# Patient Record
Sex: FEMALE | Race: WHITE | NOT HISPANIC OR LATINO | URBAN - METROPOLITAN AREA
[De-identification: names, ages, dates, MRNs, and addresses within clinical notes are randomized per-mention and may not be internally consistent; named-entity substitution may affect disease eponyms.]

---

## 2020-08-07 ENCOUNTER — OFFICE VISIT (OUTPATIENT)
Dept: OBSTETRICS AND GYNECOLOGY | Facility: CLINIC | Age: 35
End: 2020-08-07

## 2020-08-07 VITALS
BODY MASS INDEX: 39.51 KG/M2 | WEIGHT: 223 LBS | SYSTOLIC BLOOD PRESSURE: 116 MMHG | HEIGHT: 63 IN | DIASTOLIC BLOOD PRESSURE: 78 MMHG

## 2020-08-07 DIAGNOSIS — Z01.419 PAP SMEAR, LOW-RISK: ICD-10-CM

## 2020-08-07 DIAGNOSIS — Z13.9 SCREENING FOR UNSPECIFIED CONDITION: ICD-10-CM

## 2020-08-07 DIAGNOSIS — Z11.51 SCREENING FOR HPV (HUMAN PAPILLOMAVIRUS): ICD-10-CM

## 2020-08-07 DIAGNOSIS — Z30.09 ENCOUNTER FOR COUNSELING REGARDING CONTRACEPTION: ICD-10-CM

## 2020-08-07 DIAGNOSIS — Z01.419 WELL WOMAN EXAM: Primary | ICD-10-CM

## 2020-08-07 DIAGNOSIS — N92.6 IRREGULAR MENSES: ICD-10-CM

## 2020-08-07 DIAGNOSIS — R63.8 INCREASED BMI (BODY MASS INDEX): ICD-10-CM

## 2020-08-07 LAB

## 2020-08-07 PROCEDURE — 81025 URINE PREGNANCY TEST: CPT | Performed by: NURSE PRACTITIONER

## 2020-08-07 PROCEDURE — 99212 OFFICE O/P EST SF 10 MIN: CPT | Performed by: NURSE PRACTITIONER

## 2020-08-07 PROCEDURE — 99395 PREV VISIT EST AGE 18-39: CPT | Performed by: NURSE PRACTITIONER

## 2020-08-07 PROCEDURE — 81002 URINALYSIS NONAUTO W/O SCOPE: CPT | Performed by: NURSE PRACTITIONER

## 2020-08-07 RX ORDER — FLUTICASONE PROPIONATE 50 MCG
SPRAY, SUSPENSION (ML) NASAL
COMMUNITY
End: 2022-04-05 | Stop reason: SDUPTHER

## 2020-08-07 RX ORDER — CITALOPRAM 40 MG/1
40 TABLET ORAL DAILY
COMMUNITY
Start: 2015-04-06 | End: 2021-06-17

## 2020-08-11 PROBLEM — R87.610 ASCUS WITH POSITIVE HIGH RISK HPV CERVICAL: Status: ACTIVE | Noted: 2020-08-11

## 2020-08-11 PROBLEM — R87.810 ASCUS WITH POSITIVE HIGH RISK HPV CERVICAL: Status: ACTIVE | Noted: 2020-08-11

## 2020-08-11 LAB
CYTOLOGIST CVX/VAG CYTO: ABNORMAL
CYTOLOGY CVX/VAG DOC CYTO: ABNORMAL
CYTOLOGY CVX/VAG DOC THIN PREP: ABNORMAL
DX ICD CODE: ABNORMAL
DX ICD CODE: ABNORMAL
HIV 1 & 2 AB SER-IMP: ABNORMAL
HPV I/H RISK 1 DNA CVX QL PROBE+SIG AMP: POSITIVE
OTHER STN SPEC: ABNORMAL
PATHOLOGIST CVX/VAG CYTO: ABNORMAL
RECOM F/U CVX/VAG CYTO: ABNORMAL
STAT OF ADQ CVX/VAG CYTO-IMP: ABNORMAL

## 2020-10-15 ENCOUNTER — OFFICE VISIT (OUTPATIENT)
Dept: OBSTETRICS AND GYNECOLOGY | Facility: CLINIC | Age: 35
End: 2020-10-15

## 2020-10-15 VITALS
SYSTOLIC BLOOD PRESSURE: 122 MMHG | WEIGHT: 221.2 LBS | DIASTOLIC BLOOD PRESSURE: 88 MMHG | BODY MASS INDEX: 39.19 KG/M2 | HEIGHT: 63 IN

## 2020-10-15 DIAGNOSIS — Z13.9 SCREENING FOR UNSPECIFIED CONDITION: ICD-10-CM

## 2020-10-15 DIAGNOSIS — R87.610 ASCUS WITH POSITIVE HIGH RISK HPV CERVICAL: Primary | ICD-10-CM

## 2020-10-15 DIAGNOSIS — Z13.9 SCREENING FOR CONDITION: ICD-10-CM

## 2020-10-15 DIAGNOSIS — R87.810 ASCUS WITH POSITIVE HIGH RISK HPV CERVICAL: Primary | ICD-10-CM

## 2020-10-15 LAB
B-HCG UR QL: NEGATIVE
BILIRUB BLD-MCNC: NEGATIVE MG/DL
CLARITY, POC: CLEAR
COLOR UR: YELLOW
GLUCOSE UR STRIP-MCNC: NEGATIVE MG/DL
INTERNAL NEGATIVE CONTROL: NEGATIVE
INTERNAL POSITIVE CONTROL: POSITIVE
KETONES UR QL: NEGATIVE
LEUKOCYTE EST, POC: NEGATIVE
Lab: 55
NITRITE UR-MCNC: NEGATIVE MG/ML
PH UR: 5 [PH] (ref 5–8)
PROT UR STRIP-MCNC: NEGATIVE MG/DL
RBC # UR STRIP: NEGATIVE /UL
SP GR UR: 1 (ref 1–1.03)
UROBILINOGEN UR QL: NORMAL

## 2020-10-15 PROCEDURE — 57456 ENDOCERV CURETTAGE W/SCOPE: CPT | Performed by: OBSTETRICS & GYNECOLOGY

## 2020-10-15 PROCEDURE — 81025 URINE PREGNANCY TEST: CPT | Performed by: OBSTETRICS & GYNECOLOGY

## 2020-10-15 PROCEDURE — 81002 URINALYSIS NONAUTO W/O SCOPE: CPT | Performed by: OBSTETRICS & GYNECOLOGY

## 2020-10-19 LAB
DX ICD CODE: NORMAL
PATH REPORT.FINAL DX SPEC: NORMAL
PATH REPORT.GROSS SPEC: NORMAL
PATH REPORT.SITE OF ORIGIN SPEC: NORMAL
PATHOLOGIST NAME: NORMAL
PAYMENT PROCEDURE: NORMAL

## 2021-05-11 ENCOUNTER — OFFICE VISIT (OUTPATIENT)
Dept: OBSTETRICS AND GYNECOLOGY | Facility: CLINIC | Age: 36
End: 2021-05-11

## 2021-05-11 VITALS
HEIGHT: 63 IN | DIASTOLIC BLOOD PRESSURE: 82 MMHG | BODY MASS INDEX: 39.23 KG/M2 | SYSTOLIC BLOOD PRESSURE: 112 MMHG | WEIGHT: 221.4 LBS

## 2021-05-11 DIAGNOSIS — Z11.51 SPECIAL SCREENING EXAMINATION FOR HUMAN PAPILLOMAVIRUS (HPV): ICD-10-CM

## 2021-05-11 DIAGNOSIS — Z01.419 PAP SMEAR, LOW-RISK: ICD-10-CM

## 2021-05-11 DIAGNOSIS — N87.9 CERVICAL DYSPLASIA: Primary | ICD-10-CM

## 2021-05-11 DIAGNOSIS — Z13.9 SCREENING FOR CONDITION: ICD-10-CM

## 2021-05-11 LAB
B-HCG UR QL: NEGATIVE
BILIRUB BLD-MCNC: NEGATIVE MG/DL
CLARITY, POC: CLEAR
COLOR UR: YELLOW
GLUCOSE UR STRIP-MCNC: NEGATIVE MG/DL
INTERNAL NEGATIVE CONTROL: NEGATIVE
INTERNAL POSITIVE CONTROL: POSITIVE
KETONES UR QL: NEGATIVE
LEUKOCYTE EST, POC: NEGATIVE
Lab: NORMAL
NITRITE UR-MCNC: NEGATIVE MG/ML
PH UR: 5 [PH] (ref 5–8)
PROT UR STRIP-MCNC: NEGATIVE MG/DL
RBC # UR STRIP: NEGATIVE /UL
SP GR UR: 1.02 (ref 1–1.03)
UROBILINOGEN UR QL: NORMAL

## 2021-05-11 PROCEDURE — 57454 BX/CURETT OF CERVIX W/SCOPE: CPT | Performed by: OBSTETRICS & GYNECOLOGY

## 2021-05-11 PROCEDURE — 81002 URINALYSIS NONAUTO W/O SCOPE: CPT | Performed by: OBSTETRICS & GYNECOLOGY

## 2021-05-11 PROCEDURE — 81025 URINE PREGNANCY TEST: CPT | Performed by: OBSTETRICS & GYNECOLOGY

## 2021-05-11 PROCEDURE — 99213 OFFICE O/P EST LOW 20 MIN: CPT | Performed by: OBSTETRICS & GYNECOLOGY

## 2021-05-11 NOTE — PROGRESS NOTES
Colposcopy Procedure Note    Chief Complaint: cervical dysplasia    Colposcopy    Date/Time: 5/11/2021 1:02 PM  Performed by: Gail Duenas DO  Authorized by: Gail Duenas DO   Preparation: Patient was prepped and draped in the usual sterile fashion.  Local anesthesia used: no    Anesthesia:  Local anesthesia used: no    Sedation:  Patient sedated: no    Patient tolerance: patient tolerated the procedure well with no immediate complications          Indications: Most recent Pap smear showed: ASCUS with POSITIVE high risk HPV.  Prior cervical/vaginal disease: CIERRA 1.  Prior cervical treatment: no treatment.    Procedure Details   The risks and benefits of the procedure and Verbal informed consent obtained.    Speculum placed in vagina and excellent visualization of cervix achieved, cervix swabbed x 3 with acetic acid solution and Lugol's solution     Findings:  Cervix: visible lesion(s) at 7 o'clock; endocervical curettage performed, cervical biopsies taken at 7 o'clock, specimen labelled and sent to pathology and hemostasis achieved with Monsel's solution.  Vaginal inspection: vaginal colposcopy not performed.  Vulvar colposcopy: vulvar colposcopy not performed.    Specimens: cervical biopsy and ECC    Complications: none.    Plan:  Specimens labelled and sent to Pathology.  Will base further treatment on Pathology findings.  Treatment options discussed with patient.    Gail Duenas DO   5/11/2021  13:00 EDT

## 2021-05-13 LAB
CYTOLOGIST CVX/VAG CYTO: NORMAL
CYTOLOGY CVX/VAG DOC CYTO: NORMAL
CYTOLOGY CVX/VAG DOC THIN PREP: NORMAL
DX ICD CODE: NORMAL
DX ICD CODE: NORMAL
HIV 1 & 2 AB SER-IMP: NORMAL
HPV I/H RISK 4 DNA CVX QL PROBE+SIG AMP: NEGATIVE
Lab: NORMAL
OTHER STN SPEC: NORMAL
PATH REPORT.FINAL DX SPEC: NORMAL
PATH REPORT.GROSS SPEC: NORMAL
PATH REPORT.RELEVANT HX SPEC: NORMAL
PATH REPORT.SITE OF ORIGIN SPEC: NORMAL
PATHOLOGIST NAME: NORMAL
PAYMENT PROCEDURE: NORMAL
RECOM F/U CVX/VAG CYTO: NORMAL
STAT OF ADQ CVX/VAG CYTO-IMP: NORMAL

## 2021-06-30 ENCOUNTER — TELEPHONE (OUTPATIENT)
Dept: OBSTETRICS AND GYNECOLOGY | Facility: CLINIC | Age: 36
End: 2021-06-30

## 2021-06-30 NOTE — TELEPHONE ENCOUNTER
Her symptoms have nothing to do with the colposcopy done 6 weeks ago. It sounds to me like she is having her cycle. She should take Ibuprofen 800mg every 8 hrs

## 2021-11-12 ENCOUNTER — LAB REQUISITION (OUTPATIENT)
Dept: LAB | Facility: HOSPITAL | Age: 36
End: 2021-11-12

## 2021-11-12 DIAGNOSIS — Z00.00 ENCOUNTER FOR GENERAL ADULT MEDICAL EXAMINATION WITHOUT ABNORMAL FINDINGS: ICD-10-CM

## 2021-11-12 PROCEDURE — U0004 COV-19 TEST NON-CDC HGH THRU: HCPCS | Performed by: PLASTIC SURGERY

## 2021-11-13 LAB — SARS-COV-2 ORF1AB RESP QL NAA+PROBE: NOT DETECTED

## 2021-12-10 ENCOUNTER — OFFICE VISIT (OUTPATIENT)
Dept: OBSTETRICS AND GYNECOLOGY | Facility: CLINIC | Age: 36
End: 2021-12-10

## 2021-12-10 VITALS
SYSTOLIC BLOOD PRESSURE: 114 MMHG | HEIGHT: 64 IN | DIASTOLIC BLOOD PRESSURE: 88 MMHG | BODY MASS INDEX: 37.49 KG/M2 | WEIGHT: 219.6 LBS

## 2021-12-10 DIAGNOSIS — Z11.51 ENCOUNTER FOR SCREENING FOR HUMAN PAPILLOMAVIRUS (HPV): ICD-10-CM

## 2021-12-10 DIAGNOSIS — Z01.419 PAP SMEAR, LOW-RISK: Primary | ICD-10-CM

## 2021-12-10 DIAGNOSIS — N94.3 PMS (PREMENSTRUAL SYNDROME): ICD-10-CM

## 2021-12-10 LAB
B-HCG UR QL: NEGATIVE
BILIRUB BLD-MCNC: NEGATIVE MG/DL
CLARITY, POC: CLEAR
COLOR UR: YELLOW
EXPIRATION DATE: NORMAL
GLUCOSE UR STRIP-MCNC: NEGATIVE MG/DL
INTERNAL NEGATIVE CONTROL: NEGATIVE
INTERNAL POSITIVE CONTROL: POSITIVE
KETONES UR QL: NEGATIVE
LEUKOCYTE EST, POC: NEGATIVE
Lab: 55
NITRITE UR-MCNC: NEGATIVE MG/ML
PH UR: 5 [PH] (ref 5–8)
PROT UR STRIP-MCNC: NEGATIVE MG/DL
RBC # UR STRIP: NEGATIVE /UL
SP GR UR: 1 (ref 1–1.03)
UROBILINOGEN UR QL: NORMAL

## 2021-12-10 PROCEDURE — 99213 OFFICE O/P EST LOW 20 MIN: CPT | Performed by: NURSE PRACTITIONER

## 2021-12-10 PROCEDURE — 81002 URINALYSIS NONAUTO W/O SCOPE: CPT | Performed by: NURSE PRACTITIONER

## 2021-12-10 PROCEDURE — 81025 URINE PREGNANCY TEST: CPT | Performed by: NURSE PRACTITIONER

## 2021-12-10 RX ORDER — SERTRALINE HYDROCHLORIDE 100 MG/1
100 TABLET, FILM COATED ORAL DAILY
COMMUNITY
Start: 2021-11-24 | End: 2022-11-24

## 2021-12-10 RX ORDER — ONDANSETRON 4 MG/1
TABLET, FILM COATED ORAL
COMMUNITY
Start: 2021-11-17 | End: 2022-04-05 | Stop reason: SDUPTHER

## 2021-12-10 NOTE — PROGRESS NOTES
"Subjective     Chief Complaint   Patient presents with   • Follow-up     PMS        Delmy Cruz is a 36 y.o.  whose LMP is Patient's last menstrual period was 11/15/2021.. She presents today with c/o PMS. She has normal periods in timing. She is not on contraception. She reports approx 2 weeks before her cycle she has intense cramping. She reports her bleeding last approx 7 days. Reports the first 2-3 days she uses a tampon and a pad. She does this as precautionary. She does not report blood clots.     She has been on birthcontrol in the past.     She also has a history of abnormal pap smears. She was last seen in  and had a normal pap and normal ECC. Per Dr. Duenas, a repeat pap in 6 months was recommended.  Her pap and colpo history are as follows:  NIL/HPV+,  ASCUS/HPV+, 10/20 ECC CIERRA 1,  NIL/HPV neg and ECC normal.     HPI    HPI    The following portions of the patient's history were reviewed and updated as appropriate:vital signs, allergies, current medications, past medical history, past social history, past surgical history and problem list      Review of Systems     Review of Systems   Constitutional: Negative.    Gastrointestinal: Negative.    Genitourinary: Positive for frequency and menstrual problem (painful periods ). Negative for vaginal discharge.   Musculoskeletal: Negative.    Skin: Negative.    Psychiatric/Behavioral: Negative.        Objective      /88   Ht 162.6 cm (64\")   Wt 99.6 kg (219 lb 9.6 oz)   LMP 11/15/2021   Breastfeeding No   BMI 37.69 kg/m²     Physical Exam    Physical Exam  Vitals reviewed.   Constitutional:       Appearance: She is well-developed.   Pulmonary:      Effort: Pulmonary effort is normal.   Genitourinary:     Exam position: Supine.      Labia:         Right: No rash, tenderness, lesion or injury.         Left: No rash, tenderness, lesion or injury.       Vagina: No signs of injury and foreign body. No vaginal discharge, erythema, " tenderness or bleeding.      Cervix: No cervical motion tenderness, discharge or friability.   Musculoskeletal:         General: No swelling. Normal range of motion.   Skin:     General: Skin is warm and dry.   Neurological:      General: No focal deficit present.      Mental Status: She is alert and oriented to person, place, and time.   Psychiatric:         Mood and Affect: Mood normal.         Behavior: Behavior normal.         Lab Review   Labs: Urine pregnancy test, Urinalysis - with micro     Imaging   No data reviewed    Assessment  Diagnoses and all orders for this visit:    1. PMS (premenstrual syndrome) (Primary)  -     POC Urinalysis Dipstick  -     POC Pregnancy, Urine        Additional Assessment:   1. Abnormal pap  2. Dysmenorrhea  3. Frequent urination     Plan     1. Abnormal pap- Check repeat pap today.   2. Dysmenorrhea- Check TVUS. Disc possible use of contraception to help control cycles. Disc plan of care TBD on US findings.   3. Frequent urination- Check urine cx.      RTO BARRY Cobb, ABDIRAHMAN  12/10/2021

## 2021-12-15 LAB
CYTOLOGIST CVX/VAG CYTO: ABNORMAL
CYTOLOGY CVX/VAG DOC CYTO: ABNORMAL
CYTOLOGY CVX/VAG DOC THIN PREP: ABNORMAL
DX ICD CODE: ABNORMAL
DX ICD CODE: ABNORMAL
HIV 1 & 2 AB SER-IMP: ABNORMAL
HPV I/H RISK 4 DNA CVX QL PROBE+SIG AMP: POSITIVE
OTHER STN SPEC: ABNORMAL
PATHOLOGIST CVX/VAG CYTO: ABNORMAL
RECOM F/U CVX/VAG CYTO: ABNORMAL
STAT OF ADQ CVX/VAG CYTO-IMP: ABNORMAL

## 2022-03-07 ENCOUNTER — TELEPHONE (OUTPATIENT)
Dept: OBSTETRICS AND GYNECOLOGY | Facility: CLINIC | Age: 37
End: 2022-03-07

## 2022-03-07 NOTE — TELEPHONE ENCOUNTER
Pt called stating she needed a Colpo and an US. I looked into her chart and I seen you stated she needs them, but can you clarify what the US is for so we can put her on the schedule? [id number]

## 2022-04-05 ENCOUNTER — OFFICE VISIT (OUTPATIENT)
Dept: OBSTETRICS AND GYNECOLOGY | Facility: CLINIC | Age: 37
End: 2022-04-05

## 2022-04-05 VITALS
BODY MASS INDEX: 37.39 KG/M2 | SYSTOLIC BLOOD PRESSURE: 112 MMHG | HEIGHT: 64 IN | WEIGHT: 219 LBS | DIASTOLIC BLOOD PRESSURE: 82 MMHG

## 2022-04-05 DIAGNOSIS — Z13.89 SCREENING FOR GENITOURINARY CONDITION: ICD-10-CM

## 2022-04-05 DIAGNOSIS — N94.3 PMS (PREMENSTRUAL SYNDROME): ICD-10-CM

## 2022-04-05 DIAGNOSIS — N87.9 CERVICAL DYSPLASIA: Primary | ICD-10-CM

## 2022-04-05 PROBLEM — Z30.09 ENCOUNTER FOR COUNSELING REGARDING CONTRACEPTION: Status: RESOLVED | Noted: 2020-08-07 | Resolved: 2022-04-05

## 2022-04-05 PROBLEM — Z01.419 WELL WOMAN EXAM: Status: RESOLVED | Noted: 2020-08-07 | Resolved: 2022-04-05

## 2022-04-05 LAB
B-HCG UR QL: NEGATIVE
EXPIRATION DATE: NORMAL
INTERNAL NEGATIVE CONTROL: NEGATIVE
INTERNAL POSITIVE CONTROL: POSITIVE
Lab: NORMAL

## 2022-04-05 PROCEDURE — 81025 URINE PREGNANCY TEST: CPT | Performed by: OBSTETRICS & GYNECOLOGY

## 2022-04-05 PROCEDURE — 57456 ENDOCERV CURETTAGE W/SCOPE: CPT | Performed by: OBSTETRICS & GYNECOLOGY

## 2022-04-05 PROCEDURE — 99213 OFFICE O/P EST LOW 20 MIN: CPT | Performed by: OBSTETRICS & GYNECOLOGY

## 2022-04-05 RX ORDER — BENZONATATE 200 MG/1
200 CAPSULE ORAL 3 TIMES DAILY
COMMUNITY
Start: 2022-03-30

## 2022-04-05 RX ORDER — FLUTICASONE PROPIONATE 50 MCG
2 SPRAY, SUSPENSION (ML) NASAL DAILY
COMMUNITY
Start: 2021-10-15 | End: 2022-10-15

## 2022-04-05 RX ORDER — METHYLPREDNISOLONE 4 MG/1
TABLET ORAL
COMMUNITY
Start: 2022-04-01

## 2022-04-05 RX ORDER — AMOXICILLIN AND CLAVULANATE POTASSIUM 875; 125 MG/1; MG/1
1 TABLET, FILM COATED ORAL 2 TIMES DAILY
COMMUNITY
Start: 2022-04-01 | End: 2022-04-08

## 2022-04-05 RX ORDER — GUAIFENESIN 600 MG/1
600-1200 TABLET, EXTENDED RELEASE ORAL
COMMUNITY
Start: 2022-04-01 | End: 2022-04-08

## 2022-04-05 RX ORDER — ALBUTEROL SULFATE 90 UG/1
AEROSOL, METERED RESPIRATORY (INHALATION)
COMMUNITY
Start: 2022-04-01

## 2022-04-05 NOTE — PROGRESS NOTES
Colposcopy Procedure Note    Chief Complaint: LGSIL    Colposcopy    Date/Time: 4/5/2022 11:08 AM  Performed by: Gail Duenas DO  Authorized by: Gail Duenas DO   Preparation: Patient was prepped and draped in the usual sterile fashion.  Local anesthesia used: no    Anesthesia:  Local anesthesia used: no    Sedation:  Patient sedated: no    Patient tolerance: patient tolerated the procedure well with no immediate complications          Indications: Most recent Pap smear showed: low-grade squamous intraepithelial neoplasia (LGSIL - encompassing HPV,mild dysplasia,CIERRA I).  Prior cervical/vaginal disease: CIERRA 1.  Prior cervical treatment: no treatment.    Procedure Details   The risks and benefits of the procedure and Verbal informed consent obtained.    Speculum placed in vagina and excellent visualization of cervix achieved, cervix swabbed x 3 with acetic acid solution and Lugol's solution     Findings:  Cervix: no visible lesions; endocervical curettage performed, specimen labelled and sent to pathology and hemostasis achieved with Monsel's solution.  Vaginal inspection: vaginal colposcopy not performed.  Vulvar colposcopy: vulvar colposcopy not performed.    Specimens: ECC    Complications: none.    Plan:  Specimens labelled and sent to Pathology.  Will base further treatment on Pathology findings.  Treatment options discussed with patient.    Gail Duenas DO   4/5/2022  10:56 EDT

## 2022-04-05 NOTE — PROGRESS NOTES
"PROBLEM VISIT    Chief Complaint: PMS      Delmy Cruz is a 36 y.o. patient who presents for follow up of cervical dysplasia.  Patient presents for colposcopy.  Patient has had abnormal Pap smears since 2020.  Her last Pap smear was in 12/2021 and revealed LGSIL.  Previous pap smear 5/2021 and was normal.  She had a Pap smear in 8/2020 revealing ASCUS with a colposcopy revealing CIERRA-1.  She is also reporting that she would like to talk about her PMS.  She reports that her cycles are lasting 7 days.  She reports that she has cramping approximately 2 weeks before her cycle and PMS symptoms.  She is interested in starting medication.  She is already on Zoloft 100 mg p.o. twice daily and reports decreased libido on it. TVUS was performed today.    Chief Complaint   Patient presents with   • Colposcopy     LGSIL +HPV             The following portions of the patient's history were reviewed and updated as appropriate: allergies, current medications and problem list.    Review of Systems   Constitutional: Negative for appetite change, chills, fatigue, fever and unexpected weight change.   Gastrointestinal: Negative for abdominal distention, abdominal pain, anal bleeding, blood in stool, constipation, diarrhea, nausea and vomiting.   Genitourinary: Positive for menstrual problem. Negative for dyspareunia, dysuria, pelvic pain, vaginal bleeding, vaginal discharge and vaginal pain.       /82   Ht 162.6 cm (64\")   Wt 99.3 kg (219 lb)   LMP 03/27/2022   BMI 37.59 kg/m²     Physical Exam  Vitals reviewed.   Constitutional:       General: She is not in acute distress.     Appearance: Normal appearance. She is well-developed. She is not ill-appearing, toxic-appearing or diaphoretic.   Genitourinary:     General: Normal vulva.      Labia:         Right: No rash, tenderness, lesion or injury.         Left: No rash, tenderness, lesion or injury.       Vagina: No signs of injury and foreign body. No vaginal discharge, " erythema, tenderness or bleeding.      Cervix: No cervical motion tenderness, discharge or friability.   Skin:     General: Skin is warm.      Coloration: Skin is not pale.      Findings: No erythema or rash.   Neurological:      General: No focal deficit present.      Mental Status: She is alert and oriented to person, place, and time. Mental status is at baseline.      Cranial Nerves: No cranial nerve deficit.      Motor: No weakness.      Coordination: Coordination normal.      Gait: Gait normal.   Psychiatric:         Mood and Affect: Mood normal.         Behavior: Behavior normal.         Thought Content: Thought content normal.         Judgment: Judgment normal.           Assessment/Plan   Diagnoses and all orders for this visit:    1. Cervical dysplasia (Primary)  -     Reference Histopathology  -     Colposcopy    2. Screening for genitourinary condition  -     POC Pregnancy, Urine    3. PMS (premenstrual syndrome)    37yo with cervical dysplasia and PMS/cramping    1) cervical dysplasia: Last Pap smear reveals LGSIL.  Patient has been having recurrent Pap smears since 8/2020.  ECC performed without difficulty.  See procedure note.    2) PMS/cramping: TVUS reveals: AV uterus with EM lining 8mm. Normal ovaries. No free fluid. Patient is already on Zoloft 100 mg p.o. daily.  Discussed starting an OCP to help with PMS symptoms.  Patient agrees. Rx Aviane.     3) contraception: Not currently sexually active.               No follow-ups on file.      Gail Duenas DO    4/5/2022  11:09 EDT

## 2022-04-07 LAB
DX ICD CODE: NORMAL
DX ICD CODE: NORMAL
PATH REPORT.FINAL DX SPEC: NORMAL
PATH REPORT.GROSS SPEC: NORMAL
PATH REPORT.SITE OF ORIGIN SPEC: NORMAL
PATHOLOGIST NAME: NORMAL
PAYMENT PROCEDURE: NORMAL

## 2022-05-23 RX ORDER — LEVONORGESTREL AND ETHINYL ESTRADIOL 0.1-0.02MG
1 KIT ORAL DAILY
Qty: 84 TABLET | Refills: 3 | Status: SHIPPED | OUTPATIENT
Start: 2022-05-23

## 2023-07-27 ENCOUNTER — OFFICE VISIT (OUTPATIENT)
Dept: OBSTETRICS AND GYNECOLOGY | Facility: CLINIC | Age: 38
End: 2023-07-27
Payer: COMMERCIAL

## 2023-07-27 VITALS
HEIGHT: 65 IN | WEIGHT: 228.6 LBS | DIASTOLIC BLOOD PRESSURE: 84 MMHG | BODY MASS INDEX: 38.09 KG/M2 | SYSTOLIC BLOOD PRESSURE: 126 MMHG

## 2023-07-27 DIAGNOSIS — Z01.419 PAP SMEAR, LOW-RISK: ICD-10-CM

## 2023-07-27 DIAGNOSIS — Z01.419 ROUTINE GYNECOLOGICAL EXAMINATION: Primary | ICD-10-CM

## 2023-07-27 DIAGNOSIS — N32.81 OVERACTIVE BLADDER: ICD-10-CM

## 2023-07-27 DIAGNOSIS — Z11.51 ENCOUNTER FOR SCREENING FOR HUMAN PAPILLOMAVIRUS (HPV): ICD-10-CM

## 2023-07-27 PROBLEM — N87.9 CERVICAL DYSPLASIA: Status: RESOLVED | Noted: 2022-04-05 | Resolved: 2023-07-27

## 2023-07-27 PROBLEM — R87.810 ASCUS WITH POSITIVE HIGH RISK HPV CERVICAL: Status: RESOLVED | Noted: 2020-08-11 | Resolved: 2023-07-27

## 2023-07-27 PROBLEM — N92.6 IRREGULAR MENSES: Status: RESOLVED | Noted: 2020-08-07 | Resolved: 2023-07-27

## 2023-07-27 PROBLEM — R87.610 ASCUS WITH POSITIVE HIGH RISK HPV CERVICAL: Status: RESOLVED | Noted: 2020-08-11 | Resolved: 2023-07-27

## 2023-07-27 RX ORDER — NORETHINDRONE ACETATE AND ETHINYL ESTRADIOL 1MG-20(21)
KIT ORAL
Qty: 84 TABLET | Refills: 5 | Status: SHIPPED | OUTPATIENT
Start: 2023-07-27

## 2023-07-27 NOTE — PROGRESS NOTES
GYN Annual Exam     Chief Complaint   Patient presents with    Annual Exam       Delmy Cruz is a 38 y.o. female who presents for annual well woman exam. She is an established patient. She is not sexually active currently. She would use condoms if plans to be sexually active.  She is happy with her contraception: Yes.     Periods are regular every 28-30 days, lasting  5-6  days. Dysmenorrhea:moderate, occurring throughout menses. C No intermenstrual bleeding, spotting, or discharge.      Performing SBE:does not do     She has a history of abnormal pap smear.     She c/o overactive bladder. She also reports leaking with sneezing. She is just completing an antibx for an UTI.     HPI    OB History          0    Para   0    Term   0       0    AB   0    Living   0         SAB   0    IAB   0    Ectopic   0    Molar   0    Multiple   0    Live Births   0                Last Pap :  LGSIL/HPV +  History of abnormal Pap smear: Yes  Pap ASCUS/HPV+. 10/20 Colpo CIERRA 1.  pap NIL. Colpo normal.  Pap LGSIL/HPV+.  ECC neg.   Family history of uterine, colon or ovarian cancer: no  Family history of breast cancer: yes - Paternal GM breast   History of abnormal mammogram: no  Gardasil Vaccine: Has not had     No past medical history on file.    Past Surgical History:   Procedure Laterality Date    WISDOM TOOTH EXTRACTION           Current Outpatient Medications:     Cetirizine HCl 10 MG capsule, Take  by mouth., Disp: , Rfl:     albuterol sulfate  (90 Base) MCG/ACT inhaler, INHALE 1 TO 2 PUFFS BY MOUTH EVERY 4 TO 6 HOURS AS NEEDED FOR SHORTNESS OF BREATH, Disp: , Rfl:     sertraline (ZOLOFT) 100 MG tablet, Take 100 mg by mouth Daily., Disp: , Rfl:     No Known Allergies    Social History     Tobacco Use    Smoking status: Never    Smokeless tobacco: Never   Substance Use Topics    Drug use: Never       Family History   Problem Relation Age of Onset    No Known Problems Father     Skin  "cancer Mother     Breast cancer Paternal Grandmother     Colon cancer Neg Hx     Ovarian cancer Neg Hx        Review of Systems   Constitutional: Negative.    Respiratory: Negative.     Cardiovascular: Negative.    Gastrointestinal: Negative.    Genitourinary:  Positive for menstrual problem (cramping).        Overactive bladder. AUGUSTINE     Musculoskeletal: Negative.    Skin: Negative.    Neurological: Negative.    Psychiatric/Behavioral: Negative.       /84   Ht 165.1 cm (65\")   Wt 104 kg (228 lb 9.6 oz)   LMP 07/01/2023   BMI 38.04 kg/mý     Physical Exam  Vitals reviewed.   Constitutional:       Appearance: She is well-developed.   Neck:      Thyroid: No thyromegaly.   Cardiovascular:      Rate and Rhythm: Normal rate and regular rhythm.      Heart sounds: Normal heart sounds.   Pulmonary:      Effort: Pulmonary effort is normal.      Breath sounds: Normal breath sounds.   Chest:   Breasts:     Right: No inverted nipple, mass, nipple discharge, skin change or tenderness.      Left: No inverted nipple, mass, nipple discharge, skin change or tenderness.   Abdominal:      General: Bowel sounds are normal.      Palpations: Abdomen is soft.   Genitourinary:     Exam position: Supine.      Labia:         Right: No rash, tenderness, lesion or injury.         Left: No rash, tenderness, lesion or injury.       Vagina: No signs of injury and foreign body. No vaginal discharge, erythema, tenderness or bleeding.      Cervix: No cervical motion tenderness, discharge or friability.      Uterus: Not deviated, not enlarged, not fixed and not tender.       Adnexa:         Right: No mass, tenderness or fullness.          Left: No mass, tenderness or fullness.        Rectum: Normal.   Musculoskeletal:         General: Normal range of motion.      Cervical back: Normal range of motion and neck supple.   Skin:     General: Skin is warm and dry.   Neurological:      General: No focal deficit present.      Mental Status: She is " alert and oriented to person, place, and time.   Psychiatric:         Mood and Affect: Mood normal.         Behavior: Behavior normal.          Assessment     Well woman exam   Contraception management  H/O abnormal pap   HPV vaccine   Enlarged thyroid   Overactive bladder    Plan   Well woman exam: Pap collected Yes. Instructed on how to perform SBE. Recommend MVI daily.    Contraception: Enc condoms. Desires to continuous dose OCPs  STD: Enc condoms. Desires STD screen today- Yes. Serum and Pap   Smoking status: non smoker  H/O abnormal pap: Await pap results for plan of care  Body mass index is 38.04 kg/mý.  HPV vaccine- Enc vaccine   Overactive bladder-   Enlarged thyroid- Followed by ENT and PCP. S/P (R) thyroidectomy.     RTO PRABDIRAHMAN Clark  7/27/2023  14:53 EDT

## 2023-07-28 LAB
HBV SURFACE AG SERPL QL IA: NEGATIVE
HCV IGG SERPL QL IA: NON REACTIVE
HIV 1+2 AB+HIV1 P24 AG SERPL QL IA: NON REACTIVE
HSV1 IGG SER IA-ACNC: <0.91 INDEX (ref 0–0.9)
HSV2 IGG SER IA-ACNC: <0.91 INDEX (ref 0–0.9)
RPR SER QL: NON REACTIVE

## 2023-08-01 LAB
C TRACH RRNA CVX QL NAA+PROBE: NEGATIVE
CYTOLOGIST CVX/VAG CYTO: ABNORMAL
CYTOLOGY CVX/VAG DOC CYTO: ABNORMAL
CYTOLOGY CVX/VAG DOC THIN PREP: ABNORMAL
DX ICD CODE: ABNORMAL
HIV 1 & 2 AB SER-IMP: ABNORMAL
HPV GENOTYPE REFLEX: ABNORMAL
HPV I/H RISK 4 DNA CVX QL PROBE+SIG AMP: POSITIVE
HPV16 DNA CVX QL PROBE+SIG AMP: POSITIVE
HPV18+45 E6+E7 MRNA CVX QL NAA+PROBE: NEGATIVE
N GONORRHOEA RRNA CVX QL NAA+PROBE: NEGATIVE
OTHER STN SPEC: ABNORMAL
STAT OF ADQ CVX/VAG CYTO-IMP: ABNORMAL
T VAGINALIS RRNA SPEC QL NAA+PROBE: NEGATIVE

## 2023-08-21 ENCOUNTER — OFFICE VISIT (OUTPATIENT)
Dept: OBSTETRICS AND GYNECOLOGY | Facility: CLINIC | Age: 38
End: 2023-08-21
Payer: COMMERCIAL

## 2023-08-21 VITALS
DIASTOLIC BLOOD PRESSURE: 84 MMHG | BODY MASS INDEX: 37.85 KG/M2 | HEIGHT: 65 IN | SYSTOLIC BLOOD PRESSURE: 122 MMHG | WEIGHT: 227.2 LBS

## 2023-08-21 DIAGNOSIS — R87.810 ASCUS WITH POSITIVE HIGH RISK HPV CERVICAL: Primary | ICD-10-CM

## 2023-08-21 DIAGNOSIS — Z23 NEED FOR HPV VACCINATION: ICD-10-CM

## 2023-08-21 DIAGNOSIS — Z13.89 SCREENING FOR GENITOURINARY CONDITION: ICD-10-CM

## 2023-08-21 DIAGNOSIS — R87.610 ASCUS WITH POSITIVE HIGH RISK HPV CERVICAL: Primary | ICD-10-CM

## 2023-08-21 LAB
B-HCG UR QL: NEGATIVE
BILIRUB BLD-MCNC: NEGATIVE MG/DL
CLARITY, POC: CLEAR
COLOR UR: YELLOW
EXPIRATION DATE: NORMAL
GLUCOSE UR STRIP-MCNC: NEGATIVE MG/DL
INTERNAL NEGATIVE CONTROL: NORMAL
INTERNAL POSITIVE CONTROL: NORMAL
KETONES UR QL: NEGATIVE
LEUKOCYTE EST, POC: NEGATIVE
Lab: NORMAL
NITRITE UR-MCNC: NEGATIVE MG/ML
PH UR: 5 [PH] (ref 5–8)
PROT UR STRIP-MCNC: NEGATIVE MG/DL
RBC # UR STRIP: ABNORMAL /UL
SP GR UR: 1 (ref 1–1.03)
UROBILINOGEN UR QL: NORMAL

## 2023-08-21 RX ORDER — FLUTICASONE PROPIONATE 50 MCG
2 SPRAY, SUSPENSION (ML) NASAL
COMMUNITY
Start: 2023-06-19

## 2023-08-21 NOTE — PROGRESS NOTES
Here today for Colpo. Hx of CIERRA 1. Last pap NILM but high risk HPV+. Using continuous CHC for contraception     Gardasil #1 today   Does not smoke  Practice monogamy     Last note:  Delmy Curz is a 38 y.o. female who presents for annual well woman exam. She is an established patient. She is not sexually active currently. She would use condoms if plans to be sexually active.  She is happy with her contraception: Yes.     Periods are regular every 28-30 days, lasting  5-6  days. Dysmenorrhea:moderate, occurring throughout menses. C No intermenstrual bleeding, spotting, or discharge.      Performing SBE:does not do     She has a history of abnormal pap smear.     She c/o overactive bladder. She also reports leaking with sneezing. She is just completing an antibx for an UTI.         OB History          0    Para   0    Term   0       0    AB   0    Living   0         SAB   0    IAB   0    Ectopic   0    Molar   0    Multiple   0    Live Births   0                Last Pap :  LGSIL/HPV +  History of abnormal Pap smear: Yes  Pap ASCUS/HPV+. 10/20 Colpo CIERRA 1.  pap NIL. Colpo normal.  Pap LGSIL/HPV+.  ECC neg.   Family history of uterine, colon or ovarian cancer: no  Family history of breast cancer: yes - Paternal GM breast   History of abnormal mammogram: no  Gardasil Vaccine: Has not had     Past Medical History:   Diagnosis Date    Abnormal Pap smear of cervix     Allergies        Past Surgical History:   Procedure Laterality Date    WISDOM TOOTH EXTRACTION           Current Outpatient Medications:     albuterol sulfate  (90 Base) MCG/ACT inhaler, INHALE 1 TO 2 PUFFS BY MOUTH EVERY 4 TO 6 HOURS AS NEEDED FOR SHORTNESS OF BREATH, Disp: , Rfl:     fluticasone (FLONASE) 50 MCG/ACT nasal spray, 2 sprays., Disp: , Rfl:     norethindrone-ethinyl estradiol FE (Junel ) 1-20 MG-MCG per tablet, Take 1 tablet daily. Skip placebo pills for continuous dosing of OCPs., Disp: 84  "tablet, Rfl: 5    Cetirizine HCl 10 MG capsule, Take  by mouth., Disp: , Rfl:     sertraline (ZOLOFT) 100 MG tablet, Take 100 mg by mouth Daily., Disp: , Rfl:   No current facility-administered medications for this visit.    No Known Allergies    Social History     Tobacco Use    Smoking status: Never    Smokeless tobacco: Never   Substance Use Topics    Drug use: Never       Family History   Problem Relation Age of Onset    No Known Problems Father     Skin cancer Mother     Breast cancer Paternal Grandmother     Colon cancer Neg Hx     Ovarian cancer Neg Hx      ROS:   + spotting       /84   Ht 165.1 cm (65\")   Wt 103 kg (227 lb 3.2 oz)   LMP 07/01/2023   BMI 37.81 kg/mý     Vitals: VSS; AF    General Appearance:  Awake. Alert. Well developed. Well nourished. In no acute distress.    Genitalia:  External: ø Vulva was normal. ø Labia showed no abnormalities. ø Clitoris was normal. ø [place]'s gland was normal. ø Bartholin's gland was normal. ø Genitalia exhibited no lesion.  Vagina: ø Mucosa was normal. ø Not tender. ø No vaginal mass was observed.  Cervix: ø No cervical discharge. ø Not tender.      Uterus: ø Not tender.  Uterine Adnexae: ø Normal without masses or tenderness.  Neurological:  ø Oriented to time, place, and person.  Skin:  ø General appearance was normal. No bruising or ecchymosis.      Colposcopy Procedure Note    Procedures          Indications: Most recent Pap smear showed:  NILM but HPV high risk +     Prior cervical/vaginal disease: CIERRA 1  Prior cervical treatment:  Colpo  .  Contraception: OCP (estrogen/progesterone)    Procedure Details   The risks and benefits of the procedure and Verbal informed consent obtained.  Pregnancy test: negative    Speculum placed in vagina and excellent visualization of cervix achieved, cervix swabbed x 3 with acetic acid solution and Lugol's solution     Findings:  Cervix: no visible lesions, no mosaicism, and no punctation; cervix swabbed with Lugol's " solution, endocervical speculum placed, SCJ visualized 360 degrees without lesions, no biopsies taken, and endocervical curettage performed.  Vaginal inspection: vaginal colposcopy not performed.  Vulvar colposcopy: vulvar colposcopy not performed.  Colpo adequacy: was adquate    Specimens: ECC sent to Path     Colpo impression: See above; Probable benign     Complications: none.   Patient tolerated procedure well.     Smoking Status: No      Plan:  Specimens labelled and sent to Pathology.  Will base further treatment on Pathology findings.    Afshin Syed,    8/21/2023  11:44 EDT          A/P   F/U path per [facility] guideline  Gardasil #1 today; f/u 2 months for # 2        Afshin Syed DO  8/21/2023  11:42 EDT

## 2023-08-25 LAB
PATH REPORT.FINAL DX SPEC: NORMAL
PATH REPORT.GROSS SPEC: NORMAL
PATH REPORT.SITE OF ORIGIN SPEC: NORMAL
PATHOLOGIST NAME: NORMAL
PAYMENT PROCEDURE: NORMAL

## 2024-04-03 ENCOUNTER — OFFICE VISIT (OUTPATIENT)
Dept: OBSTETRICS AND GYNECOLOGY | Facility: CLINIC | Age: 39
End: 2024-04-03
Payer: COMMERCIAL

## 2024-04-03 VITALS
HEIGHT: 65 IN | DIASTOLIC BLOOD PRESSURE: 82 MMHG | WEIGHT: 218 LBS | SYSTOLIC BLOOD PRESSURE: 120 MMHG | BODY MASS INDEX: 36.32 KG/M2

## 2024-04-03 DIAGNOSIS — Z11.3 SCREENING EXAMINATION FOR STD (SEXUALLY TRANSMITTED DISEASE): ICD-10-CM

## 2024-04-03 DIAGNOSIS — Z87.42 HISTORY OF ABNORMAL CERVICAL PAP SMEAR: ICD-10-CM

## 2024-04-03 DIAGNOSIS — A63.0 VAGINAL VENEREAL WARTS: ICD-10-CM

## 2024-04-03 DIAGNOSIS — N89.8 VAGINAL ODOR: ICD-10-CM

## 2024-04-03 DIAGNOSIS — Z23 NEED FOR HPV VACCINATION: ICD-10-CM

## 2024-04-03 DIAGNOSIS — Z76.89 ENCOUNTER TO ESTABLISH CARE WITH NEW DOCTOR: ICD-10-CM

## 2024-04-03 DIAGNOSIS — Z12.31 ENCOUNTER FOR SCREENING MAMMOGRAM FOR MALIGNANT NEOPLASM OF BREAST: ICD-10-CM

## 2024-04-03 DIAGNOSIS — N92.0 MENORRHAGIA WITH REGULAR CYCLE: ICD-10-CM

## 2024-04-03 DIAGNOSIS — Z13.89 SCREENING FOR GENITOURINARY CONDITION: Primary | ICD-10-CM

## 2024-04-03 LAB
BILIRUB BLD-MCNC: NEGATIVE MG/DL
CLARITY, POC: ABNORMAL
COLOR UR: ABNORMAL
GLUCOSE UR STRIP-MCNC: NEGATIVE MG/DL
KETONES UR QL: NEGATIVE
LEUKOCYTE EST, POC: NEGATIVE
NITRITE UR-MCNC: NEGATIVE MG/ML
PH UR: 5 [PH] (ref 5–8)
PROT UR STRIP-MCNC: NEGATIVE MG/DL
RBC # UR STRIP: ABNORMAL /UL
SP GR UR: 1.01 (ref 1–1.03)
UROBILINOGEN UR QL: NORMAL

## 2024-04-03 RX ORDER — ESCITALOPRAM OXALATE 20 MG/1
TABLET ORAL
COMMUNITY
Start: 2024-04-03

## 2024-04-03 NOTE — PROGRESS NOTES
New GYN Exam    CC- Here for multiple issues    Delmy Cruz is a 38 y.o. female established patient of practice who is new to me who presents for multiple issues. She is concerned about vaginal lesions she noticed in December. They are irritated and she is concerned they may be warts. I offered to take them off for her today but they may make it difficult to wear a pad and she is on her cycle today. She also has vaginal odor and discharge. Periods are regular every 28-30 days, lasting 7 days and are heavy. Review of her chart shows that she has had abnormal paps off and on for a long time and has never had an excisional procedure. We discussed that this is an option for her . We also discussed Gardasil vaccination and she has had one dose so far.   Her paps are as follows:    2023- colpo, ECC neg  223- normal pap + HPV 16  2022- colpo, ECC neg  2021- normal pap + HPV ( not typed)  2021- normal pap/HPV, colpo bx and ECC neg  2020- ASCUS + HPV, CIERRA 1 on colpo  2018- normal pap smear.     OB History          0    Para   0    Term   0       0    AB   0    Living   0         SAB   0    IAB   0    Ectopic   0    Molar   0    Multiple   0    Live Births   0          Obstetric Comments   No plans               Menarche: 8   Current contraception: condoms  History of abnormal Pap smear: yes -  intermittent abnormals  History of abnormal mammogram: no  Family history of uterine, colon or ovarian cancer: no  Family history of breast cancer: yes - PGM dx 60s  H/o STDs: genital warts  Last pap:2023 normal pap + HPV 16  Gardasil: in progress 1/3  SHERLEY: none    Health Maintenance   Topic Date Due    ANNUAL PHYSICAL  Never done    BMI FOLLOWUP  2021    COVID-19 Vaccine (3 - 2023- season) 2023    Annual Gynecologic Pelvic and Breast Exam  2024    INFLUENZA VACCINE  2024    PAP SMEAR  2026    TDAP/TD VACCINES (2 - Td or Tdap) 2030    HEPATITIS C SCREENING   Completed    Pneumococcal Vaccine 0-64  Aged Out       Past Medical History:   Diagnosis Date    Abnormal Pap smear of cervix     Allergies     Cervical dysplasia     Disease of thyroid gland     HPV (human papilloma virus) infection        Past Surgical History:   Procedure Laterality Date    BREAST AUGMENTATION      one side    REDUCTION MAMMAPLASTY      one side    THYROIDECTOMY, PARTIAL      WISDOM TOOTH EXTRACTION           Current Outpatient Medications:     escitalopram (LEXAPRO) 20 MG tablet, , Disp: , Rfl:     doxycycline (VIBRAMYICN) 100 MG tablet, Take 1 tablet by mouth 2 (Two) Times a Day for 7 days., Disp: 14 tablet, Rfl: 0    fluticasone (FLONASE) 50 MCG/ACT nasal spray, 2 sprays., Disp: , Rfl:     norethindrone-ethinyl estradiol FE (Junel FE 1/20) 1-20 MG-MCG per tablet, Take 1 tablet daily. Skip placebo pills for continuous dosing of OCPs., Disp: 84 tablet, Rfl: 5    Allergies   Allergen Reactions    Octacosanol Other (See Comments)       Social History     Tobacco Use    Smoking status: Never    Smokeless tobacco: Never   Vaping Use    Vaping status: Never Used   Substance Use Topics    Alcohol use: Yes     Comment: socially     Drug use: Never       Family History   Problem Relation Age of Onset    No Known Problems Father     Skin cancer Mother     Breast cancer Paternal Grandmother     Colon cancer Neg Hx     Ovarian cancer Neg Hx     Uterine cancer Neg Hx     Deep vein thrombosis Neg Hx     Pulmonary embolism Neg Hx        Review of Systems   Constitutional:  Positive for activity change. Negative for appetite change, fatigue, fever and unexpected weight change.   Eyes:  Negative for photophobia and visual disturbance.   Respiratory:  Negative for cough and shortness of breath.    Cardiovascular:  Negative for chest pain and palpitations.   Gastrointestinal:  Negative for abdominal distention, abdominal pain, constipation, diarrhea and nausea.   Endocrine: Negative for cold intolerance and heat  "intolerance.   Genitourinary:  Positive for genital sores, menstrual problem, vaginal bleeding and vaginal discharge. Negative for dyspareunia, dysuria and pelvic pain.   Musculoskeletal:  Negative for back pain.   Skin:  Negative for color change and rash.   Neurological:  Negative for headaches.   Hematological:  Negative for adenopathy. Does not bruise/bleed easily.   Psychiatric/Behavioral:  Negative for dysphoric mood. The patient is not nervous/anxious.        /82   Ht 165.1 cm (65\")   Wt 98.9 kg (218 lb)   LMP 03/31/2024   BMI 36.28 kg/m²     Physical Exam  Vitals and nursing note reviewed. Exam conducted with a chaperone present.   Constitutional:       Appearance: Normal appearance. She is well-developed. She is obese.   HENT:      Head: Normocephalic and atraumatic.   Eyes:      General: No scleral icterus.     Conjunctiva/sclera: Conjunctivae normal.   Neck:      Thyroid: No thyromegaly.   Abdominal:      General: There is no distension.      Palpations: Abdomen is soft. There is no mass.      Tenderness: There is no abdominal tenderness. There is no guarding or rebound.      Hernia: No hernia is present.   Genitourinary:     Labia:         Right: Lesion present. No rash, tenderness or injury.         Left: No rash, tenderness, lesion or injury.       Urethra: No prolapse, urethral pain, urethral swelling or urethral lesion.      Vagina: Bleeding present.       Skin:     General: Skin is warm and dry.   Neurological:      Mental Status: She is alert and oriented to person, place, and time.   Psychiatric:         Mood and Affect: Mood normal.         Behavior: Behavior normal.         Thought Content: Thought content normal.         Judgment: Judgment normal.               Assessment/Plan  1) Genital warts- d/w pt options TCA, [place] and removal in the office or OR. She plans removal in the office at a later date.   2) H/O abnormal pap smear since 8/2020- consider [facility].   3) Vaginal odor- check " NuSwab Y/M/L/B  4) Check STD panel  5) Gardasil vaccine- second dose today. Last dose in 4 months.   6) Schedule MMG now  7) Menorrhagia- check TSH, CBC and ferritin next visit. Schedule TVUS and preop CKC, possible D&C.   8)EPIC LOS calculator used to determine coding level.          Diagnoses and all orders for this visit:    1. Screening for genitourinary condition (Primary)  -     POC Urinalysis Dipstick    2. Screening examination for STD (sexually transmitted disease)  -     Cancel: Hepatitis B Surface Antigen  -     Cancel: Hepatitis C Antibody  -     Cancel: HIV-1 / O / 2 Ag / Antibody  -     Cancel: HSV 1 & 2 - Specific Antibody, IgG  -     Cancel: RPR, Rfx Qn RPR / Confirm TP    3. Vaginal odor  -     Cancel: NuSwab VG+ - Swab, Vagina  -     Cancel: Genital Mycoplasmas VAN, Swab - Swab, Vagina  -     NuSwab VG+ - Swab, Vagina  -     Genital Mycoplasmas VAN, Swab - Swab, Vagina    4. Encounter for screening mammogram for malignant neoplasm of breast  -     Mammo Screening Digital Tomosynthesis Bilateral With CAD; Future    5. Need for HPV vaccination  -     HPV 9-Valent Recomb Vaccine suspension 0.5 mL    6. Vaginal venereal warts    7. Encounter to establish care with new doctor    8. Menorrhagia with regular cycle    9. History of abnormal cervical Pap smear          Perri Fairbanks MD  04/03/2024  15:55 EDT

## 2024-04-07 LAB
A VAGINAE DNA VAG QL NAA+PROBE: NORMAL SCORE
BVAB2 DNA VAG QL NAA+PROBE: NORMAL SCORE
C ALBICANS DNA VAG QL NAA+PROBE: NEGATIVE
C GLABRATA DNA VAG QL NAA+PROBE: NEGATIVE
C TRACH DNA VAG QL NAA+PROBE: NEGATIVE
M GENITALIUM DNA SPEC QL NAA+PROBE: NEGATIVE
M HOMINIS DNA SPEC QL NAA+PROBE: NEGATIVE
MEGA1 DNA VAG QL NAA+PROBE: NORMAL SCORE
N GONORRHOEA DNA VAG QL NAA+PROBE: NEGATIVE
T VAGINALIS DNA VAG QL NAA+PROBE: NEGATIVE
UREAPLASMA DNA SPEC QL NAA+PROBE: POSITIVE

## 2024-04-08 RX ORDER — DOXYCYCLINE HYCLATE 100 MG
100 TABLET ORAL 2 TIMES DAILY
Qty: 14 TABLET | Refills: 0 | Status: SHIPPED | OUTPATIENT
Start: 2024-04-08 | End: 2024-04-15

## 2024-04-09 ENCOUNTER — OFFICE VISIT (OUTPATIENT)
Dept: OBSTETRICS AND GYNECOLOGY | Facility: CLINIC | Age: 39
End: 2024-04-09
Payer: COMMERCIAL

## 2024-04-09 VITALS
SYSTOLIC BLOOD PRESSURE: 100 MMHG | HEIGHT: 65 IN | BODY MASS INDEX: 36.19 KG/M2 | DIASTOLIC BLOOD PRESSURE: 72 MMHG | WEIGHT: 217.2 LBS

## 2024-04-09 DIAGNOSIS — Z11.3 SCREENING EXAMINATION FOR STD (SEXUALLY TRANSMITTED DISEASE): ICD-10-CM

## 2024-04-09 DIAGNOSIS — A63.0 GENITAL WARTS: Primary | ICD-10-CM

## 2024-04-09 NOTE — PROGRESS NOTES
"Subjective     Chief Complaint   Patient presents with    Genital Warts     REMOVAL    Exposure to STD       Delmy Cruz is a 38 y.o.  whose LMP is Patient's last menstrual period was 2024.. This patient is new to me- yes, but is an established patient of this practice.  She presents with genital warts    HPI    Stephen bumps in anal area in December.  Never had bumps before.  Denies pain; bleeds occasionally; thinks there are 3 in area.  Told they were warts at her AE last week.  Desires STI testing- denies exposure.  Having typical discharge; currently on Doxycyline for ureaplasma.        The following portions of the patient's history were reviewed and updated as appropriate:vital signs, allergies, current medications, past medical history, past social history, past surgical history, and problem list      Review of Systems     Review of Systems   Genitourinary:  Positive for genital sores. Negative for dysuria.       Objective      /72   Ht 165.1 cm (65\")   Wt 98.5 kg (217 lb 3.2 oz)   LMP 2024   BMI 36.14 kg/m²     Physical Exam    Physical Exam  Vitals reviewed.   Constitutional:       General: She is awake. She is not in acute distress.     Appearance: She is not ill-appearing.   Eyes:      Conjunctiva/sclera: Conjunctivae normal.   Pulmonary:      Effort: Pulmonary effort is normal. No respiratory distress.   Genitourinary:     General: Normal vulva.      Exam position: Supine.      Rectum: Mass (lesions- see diagram) present. No external hemorrhoid.          Comments: See biopsy procedure note  Musculoskeletal:      Cervical back: Neck supple. No rigidity.   Skin:     General: Skin is warm and dry.      Capillary Refill: Capillary refill takes less than 2 seconds.   Neurological:      Mental Status: She is alert and oriented to person, place, and time.   Psychiatric:         Mood and Affect: Mood and affect normal.         Behavior: Behavior normal.       PROCEDURE NOTE: "     ANAL BIOPSY    PREOP DX:  genital wart x 3    POSTOP DX: genital wart x 3    PROCEDURE: excision of lesions    DESCRIPTION OF PROCEDURE:     Informed consent obtain. Pt in lithotomy position. Area prepped with betadine and draped. Area to be biopsied was infiltrated with 1% Lidocaine. Sterile scissors with forceps used to obtain the biopsies. The specimens were sent for permanent section.  Antibiotic ointment applied to area.  Hemostasis was obtained.     Patient tolerated procedure well. No complications noted. Instructions and precautions were given. Pt to call for results and plan of care will be based on results.       Lab Review   Labs: No data reviewed     Imaging: No data reviewed      Assessment/Plan  Diagnoses and all orders for this visit:    1. Genital warts (Primary)  -     Reference Histopathology    2. Screening examination for STD (sexually transmitted disease)  -     Hepatitis B Surface Antigen  -     Hepatitis C Antibody  -     HIV-1 / O / 2 Ag / Antibody  -     HSV 1 & 2 - Specific Antibody, IgG  -     RPR, Rfx Qn RPR / Confirm TP  -     Chlamydia trachomatis, Neisseria gonorrhoeae, Trichomonas vaginalis, PCR - Urine, Urine, Clean Catch      Rec OTC pain medication and apply OTC antibiotic cream until area is well-healed.      Return as needed.    I spent 20 minutes caring for Delmy on this date of service. This time includes time spent by me in the following activities: preparing for the visit, reviewing tests, obtaining and/or reviewing a separately obtained history, performing a medically appropriate examination and/or evaluation, counseling and educating the patient/family/caregiver, ordering medications, tests, or procedures, and documenting information in the medical record.     Iman Emanuel, ABDIRAHMAN  4/9/2024  12:02 EDT

## 2024-04-11 LAB
C TRACH RRNA SPEC QL NAA+PROBE: NEGATIVE
N GONORRHOEA RRNA SPEC QL NAA+PROBE: NEGATIVE
T VAGINALIS RRNA SPEC QL NAA+PROBE: NEGATIVE

## 2024-05-15 ENCOUNTER — PREP FOR SURGERY (OUTPATIENT)
Dept: OTHER | Facility: HOSPITAL | Age: 39
End: 2024-05-15
Payer: COMMERCIAL

## 2024-05-15 ENCOUNTER — OFFICE VISIT (OUTPATIENT)
Dept: OBSTETRICS AND GYNECOLOGY | Facility: CLINIC | Age: 39
End: 2024-05-15
Payer: COMMERCIAL

## 2024-05-15 VITALS
WEIGHT: 222.6 LBS | HEIGHT: 65 IN | DIASTOLIC BLOOD PRESSURE: 72 MMHG | SYSTOLIC BLOOD PRESSURE: 106 MMHG | BODY MASS INDEX: 37.09 KG/M2

## 2024-05-15 DIAGNOSIS — N92.0 MENORRHAGIA WITH REGULAR CYCLE: ICD-10-CM

## 2024-05-15 DIAGNOSIS — Z01.818 PREOP EXAMINATION: Primary | ICD-10-CM

## 2024-05-15 DIAGNOSIS — R30.0 DYSURIA: ICD-10-CM

## 2024-05-15 DIAGNOSIS — R87.810 HUMAN PAPILLOMAVIRUS (HPV) TYPE 16 DNA DETECTED IN CERVICAL SPECIMEN: Primary | ICD-10-CM

## 2024-05-15 DIAGNOSIS — Z87.42 HISTORY OF ABNORMAL CERVICAL PAP SMEAR: ICD-10-CM

## 2024-05-15 DIAGNOSIS — R93.89 THICKENED ENDOMETRIUM: ICD-10-CM

## 2024-05-15 DIAGNOSIS — R87.619 ABNORMAL CERVICAL PAPANICOLAOU SMEAR, UNSPECIFIED ABNORMAL PAP FINDING: ICD-10-CM

## 2024-05-15 LAB
B-HCG UR QL: NEGATIVE
BILIRUB BLD-MCNC: NEGATIVE MG/DL
CLARITY, POC: CLEAR
COLOR UR: YELLOW
EXPIRATION DATE: NORMAL
GLUCOSE UR STRIP-MCNC: NEGATIVE MG/DL
INTERNAL NEGATIVE CONTROL: NORMAL
INTERNAL POSITIVE CONTROL: NORMAL
KETONES UR QL: NEGATIVE
LEUKOCYTE EST, POC: NEGATIVE
Lab: NORMAL
NITRITE UR-MCNC: NEGATIVE MG/ML
PH UR: 5 [PH] (ref 5–8)
PROT UR STRIP-MCNC: NEGATIVE MG/DL
RBC # UR STRIP: NEGATIVE /UL
SP GR UR: 1 (ref 1–1.03)
UROBILINOGEN UR QL: NORMAL

## 2024-05-15 RX ORDER — SODIUM CHLORIDE 0.9 % (FLUSH) 0.9 %
10 SYRINGE (ML) INJECTION EVERY 12 HOURS SCHEDULED
OUTPATIENT
Start: 2024-05-15

## 2024-05-15 RX ORDER — EPINEPHRINE 0.3 MG/.3ML
INJECTION SUBCUTANEOUS
COMMUNITY
Start: 2024-04-30

## 2024-05-15 RX ORDER — SODIUM CHLORIDE 0.9 % (FLUSH) 0.9 %
10 SYRINGE (ML) INJECTION AS NEEDED
OUTPATIENT
Start: 2024-05-15

## 2024-05-15 RX ORDER — SODIUM CHLORIDE 9 MG/ML
40 INJECTION, SOLUTION INTRAVENOUS AS NEEDED
OUTPATIENT
Start: 2024-05-15

## 2024-05-15 NOTE — PROGRESS NOTES
"      Delmy Cruz is a 39 y.o. patient who presents for follow up of   Chief Complaint   Patient presents with    Pre-op Exam       39-year-old established patient here for ultrasound and preop for CKC.  She has cycles that are regular every 20 to 30 days that last 7 days and are heavy.  Her ultrasound today shows this 8.3 cm anteverted uterus with an endometrial lining of 1.4 cm.  Her left ovary is normal.  Her right ovary has a simple cyst that measures 1.8 x 1.5 x 1.5 cm.  There is no free fluid.  Her ultrasound is compared to her last scan on 2022.  She is also complaining of some urinary urgency.  We will check a UA and culture, however, have recommended that she decrease bladder irritants to see if this will help her.  She has had 2 out of her 3 Gardasil vaccinations.  She has a history of abnormal Paps off and on for a long time and has never had an excisional procedure.  She is not interested in pregnancy.  We did discuss that an excisional procedure such as a CKC will theoretically increase her risk of  delivery.  Her Paps are as follows:    2023- colpo, ECC neg  223- normal pap + HPV 16  2022- colpo, ECC neg  2021- normal pap + HPV ( not typed)  2021- normal pap/HPV, colpo bx and ECC neg  2020- ASCUS + HPV, CIERRA 1 on colpo  2018- normal pap smear.     The following portions of the patient's history were reviewed and updated as appropriate: allergies, current medications and problem list.    Review of Systems   Constitutional:  Positive for activity change.   Genitourinary:  Positive for frequency, menstrual problem and urgency.   All other systems reviewed and are negative.      /72   Ht 165.1 cm (65\")   Wt 101 kg (222 lb 9.6 oz)   LMP 2024 (Exact Date)   BMI 37.04 kg/m²     Physical Exam  Vitals and nursing note reviewed.   Constitutional:       Appearance: Normal appearance. She is well-developed. She is obese.   HENT:      Head: Normocephalic and " atraumatic.   Eyes:      General: No scleral icterus.     Conjunctiva/sclera: Conjunctivae normal.   Neck:      Thyroid: No thyromegaly.   Cardiovascular:      Rate and Rhythm: Normal rate and regular rhythm.   Pulmonary:      Effort: Pulmonary effort is normal.      Breath sounds: Normal breath sounds.   Abdominal:      General: There is no distension.      Palpations: Abdomen is soft. There is no mass.      Tenderness: There is no abdominal tenderness. There is no guarding or rebound.      Hernia: No hernia is present.   Skin:     General: Skin is warm and dry.   Neurological:      Mental Status: She is alert and oriented to person, place, and time.   Psychiatric:         Mood and Affect: Mood normal.         Behavior: Behavior normal.         Thought Content: Thought content normal.         Judgment: Judgment normal.         A/P:  1.  History of abnormal Pap smear and menorrhagia-schedule CKC, hysteroscopy D&C with myosure in the OR.Risks, benefits and alternatives of the procedure were discussed, including , but not limited to: infection, bleeding, transfusion, injury to adjacent structures, laparotomy, possible nondiagnostic findings, possible findings of unexpected malignancy, reoperation, recurrent symptoms, thromboembolic events, anaeasthetic complications and death. Pre/intra/postop course was reviewed and all questions answered. Patient was encouraged to call for any additional questions she might have in the future.   2. Urinary frequency/urgency and dysuria- check UA and CS. Many things that we eat or drink may cause the bladder to contract unexpectedly and leak urine.  Unfortunately, these are often things that are most enjoyable to drink such as caffeine, carbonated beverages, dona, coffees, teas and citrus juices.  As a first step to help decreasing pain and leakage associated with the bladder, we encourage patients to try decreasing these bladder irritants by 50% a week until they are mostly  eliminated them from the diet.  Patients can often avoid surgery and daily medication with these dietary changes.  3. Gardsail 2/3, in progress.     Assessment & Plan   Diagnoses and all orders for this visit:    1. Preop examination (Primary)  -     POC Urinalysis Dipstick, Multipro  -     POC Pregnancy, Urine    2. Dysuria  -     Urinalysis With Microscopic - Urine, Clean Catch  -     Urine Culture - Urine, Urine, Clean Catch    3. Menorrhagia with regular cycle    4. Abnormal cervical Papanicolaou smear, unspecified abnormal pap finding    Other orders  -     Microscopic Examination -                 No follow-ups on file.      Perri Fairbanks MD    5/15/2024  15:53 EDT

## 2024-05-16 PROBLEM — N92.0 MENORRHAGIA WITH REGULAR CYCLE: Status: ACTIVE | Noted: 2024-05-15

## 2024-05-16 PROBLEM — Z87.42 HISTORY OF ABNORMAL CERVICAL PAP SMEAR: Status: ACTIVE | Noted: 2024-05-15

## 2024-05-16 PROBLEM — R87.810 HUMAN PAPILLOMAVIRUS (HPV) TYPE 16 DNA DETECTED IN CERVICAL SPECIMEN: Status: ACTIVE | Noted: 2024-05-15

## 2024-05-16 PROBLEM — R93.89 THICKENED ENDOMETRIUM: Status: ACTIVE | Noted: 2024-05-15

## 2024-05-17 LAB
APPEARANCE UR: CLEAR
BACTERIA #/AREA URNS HPF: ABNORMAL /HPF
BACTERIA UR CULT: NORMAL
BACTERIA UR CULT: NORMAL
BILIRUB UR QL STRIP: NEGATIVE
CASTS URNS MICRO: ABNORMAL
COLOR UR: YELLOW
EPI CELLS #/AREA URNS HPF: ABNORMAL /HPF
GLUCOSE UR QL STRIP: NEGATIVE
HGB UR QL STRIP: NEGATIVE
KETONES UR QL STRIP: NEGATIVE
LEUKOCYTE ESTERASE UR QL STRIP: NEGATIVE
NITRITE UR QL STRIP: NEGATIVE
PH UR STRIP: 6 [PH] (ref 5–8)
PROT UR QL STRIP: NEGATIVE
RBC #/AREA URNS HPF: ABNORMAL /HPF
SP GR UR STRIP: 1.03 (ref 1–1.03)
UROBILINOGEN UR STRIP-MCNC: NORMAL MG/DL
WBC #/AREA URNS HPF: ABNORMAL /HPF

## 2024-06-13 ENCOUNTER — TELEPHONE (OUTPATIENT)
Dept: OBSTETRICS AND GYNECOLOGY | Facility: CLINIC | Age: 39
End: 2024-06-13
Payer: COMMERCIAL

## 2024-06-13 NOTE — TELEPHONE ENCOUNTER
Patient is cancelling surgery due to cost. She is going to apply for financial assistance and if approved, will reschedule.

## 2024-07-30 ENCOUNTER — OFFICE VISIT (OUTPATIENT)
Dept: OBSTETRICS AND GYNECOLOGY | Facility: CLINIC | Age: 39
End: 2024-07-30
Payer: COMMERCIAL

## 2024-07-30 VITALS
BODY MASS INDEX: 34.66 KG/M2 | HEIGHT: 65 IN | WEIGHT: 208 LBS | SYSTOLIC BLOOD PRESSURE: 114 MMHG | DIASTOLIC BLOOD PRESSURE: 72 MMHG

## 2024-07-30 DIAGNOSIS — Z11.51 SPECIAL SCREENING EXAMINATION FOR HUMAN PAPILLOMAVIRUS (HPV): ICD-10-CM

## 2024-07-30 DIAGNOSIS — Z87.42 HISTORY OF ABNORMAL CERVICAL PAP SMEAR: ICD-10-CM

## 2024-07-30 DIAGNOSIS — Z01.419 PAP SMEAR, AS PART OF ROUTINE GYNECOLOGICAL EXAMINATION: ICD-10-CM

## 2024-07-30 DIAGNOSIS — Z01.419 ROUTINE GYNECOLOGICAL EXAMINATION: Primary | ICD-10-CM

## 2024-07-30 LAB
B-HCG UR QL: NEGATIVE
BILIRUB BLD-MCNC: NEGATIVE MG/DL
CLARITY, POC: CLEAR
COLOR UR: YELLOW
EXPIRATION DATE: NORMAL
GLUCOSE UR STRIP-MCNC: NEGATIVE MG/DL
INTERNAL NEGATIVE CONTROL: NORMAL
INTERNAL POSITIVE CONTROL: NORMAL
KETONES UR QL: NEGATIVE
LEUKOCYTE EST, POC: NEGATIVE
Lab: NORMAL
NITRITE UR-MCNC: NEGATIVE MG/ML
PH UR: 5 [PH] (ref 5–8)
PROT UR STRIP-MCNC: NEGATIVE MG/DL
RBC # UR STRIP: NEGATIVE /UL
SP GR UR: 1.01 (ref 1–1.03)
UROBILINOGEN UR QL: NORMAL

## 2024-07-30 PROCEDURE — 81025 URINE PREGNANCY TEST: CPT | Performed by: NURSE PRACTITIONER

## 2024-07-30 PROCEDURE — 99396 PREV VISIT EST AGE 40-64: CPT | Performed by: NURSE PRACTITIONER

## 2024-07-30 PROCEDURE — 81002 URINALYSIS NONAUTO W/O SCOPE: CPT | Performed by: NURSE PRACTITIONER

## 2024-07-30 RX ORDER — ESCITALOPRAM OXALATE 10 MG/1
1.5 TABLET ORAL DAILY
COMMUNITY
Start: 2024-07-16

## 2024-07-30 RX ORDER — CLINDAMYCIN PHOSPHATE 10 MG/G
AEROSOL, FOAM TOPICAL
COMMUNITY
Start: 2024-05-08

## 2024-07-30 NOTE — PROGRESS NOTES
GYN Annual Exam     Chief Complaint   Patient presents with    Gynecologic Exam       Delmy Cruz is a 39 y.o. female who presents for annual well woman exam. She is new to me - no. She is not sexually active. Currently using abstinence for contraception; plans to use condoms when she becomes sexually active. Has RX for ocp but doesn't take them.  She is happy with her contraception: Yes.     Hx of multiple abnormal Paps with HPV+.  Three anal warts removed in 2024.  Scheduled for CKC, hysteroscopy D&C with myosure in 2024 but decided to not go through with procedure.       Periods are regular every 28-30 days, lasting 7 days. Dysmenorrhea: a week prior to starting menses. Cyclic symptoms include bloating. No intermenstrual bleeding, spotting, or discharge.  Performing SBE: on occasion    HPI    OB History          0    Para   0    Term   0       0    AB   0    Living   0         SAB   0    IAB   0    Ectopic   0    Molar   0    Multiple   0    Live Births   0                Last Pap: 2023- NIL, HPV+  Last Mammogram: never had one; has implant in right breast- checking with breast specialist to see if MMG is ok  Last Colonoscopy: N/A; due at 43 y.o.  History of abnormal Pap smear: yes - Pap ASCUS/HPV+ 2020; colpo- CIN1 10/2020; Pap NIL/colpo neg 2021; Pap LGSIL/HPV+ 2021; 2022 ECC neg; 2023 Pap NIL, HPV+; 2023 colpo- ECC neg  Family history of uterine, colon or ovarian cancer: no  Family history of breast cancer: yes - paternal Gma in 60's  History of abnormal mammogram:  never had one  Gardasil Vaccine: in process; received 2/3    Past Medical History:   Diagnosis Date    Abnormal Pap smear of cervix     Allergies     Anxiety     Cervical dysplasia     Depression     On medication    Disease of thyroid gland     HPV (human papilloma virus) infection     PMS (premenstrual syndrome)     PONV (postoperative nausea and vomiting)        Past Surgical History:   Procedure  "Laterality Date    BREAST AUGMENTATION      one side    REDUCTION MAMMAPLASTY      one side    THYROIDECTOMY, PARTIAL      WISDOM TOOTH EXTRACTION           Current Outpatient Medications:     Clindamycin Phosphate 1 % foam, APPLY TOPICALLY TO THE AFFECTED AREA DAILY, Disp: , Rfl:     escitalopram (LEXAPRO) 10 MG tablet, Take 1.5 tablets by mouth Daily., Disp: , Rfl:     EPINEPHrine (EPIPEN) 0.3 MG/0.3ML solution auto-injector injection, INJECT 1 PEN IN THE MUSCLE ONE TIME AS DIRECTED, Disp: , Rfl:     escitalopram (LEXAPRO) 20 MG tablet, , Disp: , Rfl:     fluticasone (FLONASE) 50 MCG/ACT nasal spray, 2 sprays., Disp: , Rfl:     norethindrone-ethinyl estradiol FE (Junel FE 1/20) 1-20 MG-MCG per tablet, Take 1 tablet daily. Skip placebo pills for continuous dosing of OCPs. (Patient not taking: Reported on 5/15/2024), Disp: 84 tablet, Rfl: 5    Allergies   Allergen Reactions    Octacosanol Other (See Comments)       Social History     Tobacco Use    Smoking status: Never    Smokeless tobacco: Never   Vaping Use    Vaping status: Never Used   Substance Use Topics    Alcohol use: Yes     Comment: Social    Drug use: Never       Family History   Problem Relation Age of Onset    No Known Problems Father     Skin cancer Mother     Breast cancer Paternal Grandmother     Colon cancer Neg Hx     Ovarian cancer Neg Hx     Uterine cancer Neg Hx     Deep vein thrombosis Neg Hx     Pulmonary embolism Neg Hx        Review of Systems   Genitourinary:  Positive for urinary incontinence (occasional, mild). Negative for breast discharge, breast lump, breast pain, menstrual problem and pelvic pain.       /72   Ht 165.1 cm (65\")   Wt 94.3 kg (208 lb)   LMP 07/22/2024   BMI 34.61 kg/m²     Physical Exam  Vitals reviewed.   Constitutional:       General: She is awake. She is not in acute distress.     Appearance: She is not ill-appearing.   HENT:      Head: Normocephalic and atraumatic.   Eyes:      Conjunctiva/sclera: " Conjunctivae normal.   Pulmonary:      Effort: Pulmonary effort is normal. No respiratory distress.   Chest:   Breasts:     Right: Normal.      Left: Normal.      Comments: Implant- right breast   Abdominal:      Palpations: Abdomen is soft. There is no mass.      Tenderness: There is no abdominal tenderness.   Genitourinary:     General: Normal vulva.      Exam position: Supine.      Pubic Area: No rash.       Labia:         Right: No rash.         Left: No rash.       Urethra: No urethral lesion.      Vagina: Normal.      Cervix: Normal.      Uterus: Normal.       Adnexa: Right adnexa normal and left adnexa normal.   Musculoskeletal:      Cervical back: Neck supple. No rigidity.   Lymphadenopathy:      Upper Body:      Right upper body: No axillary adenopathy.      Left upper body: No axillary adenopathy.      Lower Body: No right inguinal adenopathy. No left inguinal adenopathy.   Skin:     General: Skin is warm and dry.      Capillary Refill: Capillary refill takes less than 2 seconds.   Neurological:      Mental Status: She is alert and oriented to person, place, and time.   Psychiatric:         Mood and Affect: Mood and affect normal.         Behavior: Behavior normal.            Assessment     Well woman exam   Contraception management      Plan   Well woman exam: Pap collected- Yes. Instructed on how to perform SBE. Recommend MVI daily.    Breast Health: Clinical breast exam & mammogram yearly, Self breast awareness. Schedule screening mammogram.  Colon health:  colonoscopy due at 43 y.o.  Contraception: abstinence  STD: Enc condoms. Desires STD screen today- No.   Gardasil: 2/3 received; due for 3rd dose next month  Smoking status:  No  Body mass index is 34.61 kg/m². Diet and exercised discussed.    Follow-up as needed and 1 year for AE.    I spent 20 minutes caring for Delmy on this date of service. This time includes time spent by me in the following activities: preparing for the visit, reviewing tests,  performing a medically appropriate examination and/or evaluation, counseling and educating the patient/family/caregiver, documenting information in the medical record, and ordering test(s).        Iman Emanuel, APRN  7/30/2024  12:22 EDT

## 2024-08-05 LAB
CYTOLOGIST CVX/VAG CYTO: NORMAL
CYTOLOGY CVX/VAG DOC CYTO: NORMAL
CYTOLOGY CVX/VAG DOC THIN PREP: NORMAL
DX ICD CODE: NORMAL
HPV I/H RISK 4 DNA CVX QL PROBE+SIG AMP: NEGATIVE
Lab: NORMAL
Lab: NORMAL
OTHER STN SPEC: NORMAL
STAT OF ADQ CVX/VAG CYTO-IMP: NORMAL

## 2024-08-30 ENCOUNTER — OFFICE VISIT (OUTPATIENT)
Dept: OBSTETRICS AND GYNECOLOGY | Facility: CLINIC | Age: 39
End: 2024-08-30
Payer: COMMERCIAL

## 2024-08-30 VITALS
WEIGHT: 212.8 LBS | DIASTOLIC BLOOD PRESSURE: 88 MMHG | SYSTOLIC BLOOD PRESSURE: 128 MMHG | HEIGHT: 65 IN | BODY MASS INDEX: 35.45 KG/M2

## 2024-08-30 DIAGNOSIS — K60.2 ANAL FISSURE: Primary | ICD-10-CM

## 2024-08-30 DIAGNOSIS — Z23 NEED FOR HPV VACCINE: ICD-10-CM

## 2024-08-30 DIAGNOSIS — K64.4 ANAL SKIN TAG: ICD-10-CM

## 2024-08-30 RX ORDER — HYDROCORTISONE ACETATE PRAMOXINE HCL 1; 1 G/100G; G/100G
CREAM TOPICAL 2 TIMES DAILY
Qty: 30 G | Refills: 1 | Status: SHIPPED | OUTPATIENT
Start: 2024-08-30

## 2024-08-30 NOTE — PROGRESS NOTES
"Subjective     Chief Complaint   Patient presents with    TENDER AND INFLAMED AREA ON BOTTOM       Delmy Cruz is a 39 y.o.  whose LMP is Patient's last menstrual period was 2024 (exact date).. This patient is new to me- no.  She presents with pain around rectum     HPI    Developed tenderness around rectum several days ago.  Was seeping light yellow yesterday.  Doesn't think it is a hemorrhoid.  Denies bleeding.  Occasional constipation.  Had 3 warts removed in area in 2024.        The following portions of the patient's history were reviewed and updated as appropriate:vital signs, allergies, current medications, past medical history, past social history, past surgical history, and problem list      Review of Systems     Review of Systems   Gastrointestinal:  Positive for constipation. Negative for anal bleeding.       Objective      /88   Ht 165.1 cm (65\")   Wt 96.5 kg (212 lb 12.8 oz)   LMP 2024 (Exact Date)   BMI 35.41 kg/m²     Physical Exam    Physical Exam  Vitals reviewed.   Constitutional:       General: She is awake. She is not in acute distress.     Appearance: She is not ill-appearing.   Eyes:      Conjunctiva/sclera: Conjunctivae normal.   Pulmonary:      Effort: Pulmonary effort is normal. No respiratory distress.   Genitourinary:     General: Normal vulva.      Exam position: Supine.      Labia:         Right: No rash or lesion.         Left: No rash or lesion.       Rectum: Mass (? skin tag vs HPV) and anal fissure (left side- no redness/swelling; in healing stages) present. No tenderness or external hemorrhoid.   Musculoskeletal:      Cervical back: Neck supple. No rigidity.   Skin:     General: Skin is warm and dry.      Capillary Refill: Capillary refill takes less than 2 seconds.   Neurological:      Mental Status: She is alert and oriented to person, place, and time.   Psychiatric:         Mood and Affect: Mood and affect normal.         Behavior: Behavior " normal.           Lab Review   Labs: No data reviewed     Imaging: No data reviewed      Assessment/Plan  Diagnoses and all orders for this visit:    1. Anal fissure (Primary)  -     Hydrocortisone Ace-Pramoxine 1-1 % rectal cream; Insert  into the rectum 2 (Two) Times a Day.  Dispense: 30 g; Refill: 1    2. Anal skin tag  -     Ambulatory Referral to Colorectal Surgery    3. Need for HPV vaccine  -     HPV 9-Valent Recomb Vaccine suspension 0.5 mL            Return if symptoms worsen or fail to improve.    I spent 20 minutes caring for Delmy on this date of service. This time includes time spent by me in the following activities: preparing for the visit, reviewing tests, performing a medically appropriate examination and/or evaluation, counseling and educating the patient/family/caregiver, referring and communicating with other health care professionals, documenting information in the medical record, and ordering medications.     Iman Emanuel, APRN  8/30/2024  09:29 EDT

## 2025-07-16 ENCOUNTER — OFFICE VISIT (OUTPATIENT)
Dept: OBSTETRICS AND GYNECOLOGY | Facility: CLINIC | Age: 40
End: 2025-07-16
Payer: COMMERCIAL

## 2025-07-16 VITALS
WEIGHT: 206 LBS | BODY MASS INDEX: 34.32 KG/M2 | DIASTOLIC BLOOD PRESSURE: 62 MMHG | SYSTOLIC BLOOD PRESSURE: 112 MMHG | HEIGHT: 65 IN

## 2025-07-16 DIAGNOSIS — A63.0 GENITAL WARTS: Primary | ICD-10-CM

## 2025-07-16 DIAGNOSIS — Z13.89 SCREENING FOR GENITOURINARY CONDITION: ICD-10-CM

## 2025-07-16 DIAGNOSIS — K60.2 ANAL FISSURE: ICD-10-CM

## 2025-07-16 LAB
BILIRUB BLD-MCNC: NEGATIVE MG/DL
CLARITY, POC: CLEAR
COLOR UR: YELLOW
GLUCOSE UR STRIP-MCNC: NEGATIVE MG/DL
KETONES UR QL: NEGATIVE
LEUKOCYTE EST, POC: NEGATIVE
NITRITE UR-MCNC: NEGATIVE MG/ML
PH UR: 5 [PH] (ref 5–8)
PROT UR STRIP-MCNC: NEGATIVE MG/DL
RBC # UR STRIP: NEGATIVE /UL
SP GR UR: 1 (ref 1–1.03)
UROBILINOGEN UR QL: NORMAL

## 2025-07-16 RX ORDER — IMIQUIMOD 12.5 MG/.25G
1 CREAM TOPICAL 3 TIMES WEEKLY
Qty: 24 EACH | Refills: 1 | Status: SHIPPED | OUTPATIENT
Start: 2025-07-16

## 2025-07-16 RX ORDER — IMIQUIMOD 50 MG/G
1 CREAM TOPICAL 3 TIMES WEEKLY
Qty: 12 G | Refills: 1 | Status: SHIPPED | OUTPATIENT
Start: 2025-07-16 | End: 2025-07-16

## 2025-07-16 RX ORDER — HYDROCORTISONE ACETATE PRAMOXINE HCL 1; 1 G/100G; G/100G
CREAM TOPICAL 2 TIMES DAILY
Qty: 30 G | Refills: 1 | Status: SHIPPED | OUTPATIENT
Start: 2025-07-16

## 2025-07-16 NOTE — PROGRESS NOTES
"Subjective     Chief Complaint   Patient presents with    Bump around Anus Area        Delmy Cruz is a 40 y.o.  whose LMP is Patient's last menstrual period was 2025.. This patient is new to me- no.  She presents with possible wart    HPI    Has another small bump in rectal area.  Had 3 warts removed in 2024; this bump is in same area.  Denies pain. Continues to get tears around her rectum; had cream to apply but has lost it.  She completed the Gardasil vaccine in 2024.        The following portions of the patient's history were reviewed and updated as appropriate:vital signs, allergies, current medications, past medical history, past social history, past surgical history, and problem list      Review of Systems     Review of Systems   Genitourinary:  Positive for genital sores. Negative for vaginal pain.       Objective      /62   Ht 165.1 cm (65\")   Wt 93.4 kg (206 lb)   LMP 2025   BMI 34.28 kg/m²     Physical Exam    Physical Exam  Vitals reviewed.   Constitutional:       General: She is awake. She is not in acute distress.     Appearance: She is obese. She is not ill-appearing.   Eyes:      Conjunctiva/sclera: Conjunctivae normal.   Pulmonary:      Effort: Pulmonary effort is normal. No respiratory distress.   Genitourinary:     General: Normal vulva.      Exam position: Lithotomy position.      Labia:         Right: No rash or lesion.         Left: No rash or lesion.        Musculoskeletal:      Cervical back: Neck supple. No rigidity.   Skin:     General: Skin is warm and dry.      Capillary Refill: Capillary refill takes less than 2 seconds.   Neurological:      Mental Status: She is alert and oriented to person, place, and time.   Psychiatric:         Mood and Affect: Mood and affect normal.         Behavior: Behavior normal.           Lab Review   Labs: Urine pregnancy test, Urinalysis - with micro     Imaging: No data reviewed      Assessment/Plan  Diagnoses and all " orders for this visit:    1. Genital warts (Primary)  -     Discontinue: Aldara 5 % cream; Apply 1 Application topically to the appropriate area as directed 3 (Three) Times a Week.  Dispense: 12 g; Refill: 1  -     imiquimod (Aldara) 5 % cream; Apply 1 Application topically to the appropriate area as directed 3 (Three) Times a Week.  Dispense: 24 each; Refill: 1    2. Anal fissure  -     Hydrocortisone Ace-Pramoxine 1-1 % rectal cream; Insert  into the rectum 2 (Two) Times a Day.  Dispense: 30 g; Refill: 1    3. Screening for genitourinary condition  -     POC Urinalysis Dipstick    Lesion c/w wart.  Discussed that warts can come back if removed in the past.  Will prescribe RX for Imiquid to apply.  Rec dermatology if no improvement.      Return if symptoms worsen or fail to improve.    Iman Emanuel, APRN  7/16/2025  10:33 EDT